# Patient Record
Sex: MALE | Race: WHITE | NOT HISPANIC OR LATINO | URBAN - METROPOLITAN AREA
[De-identification: names, ages, dates, MRNs, and addresses within clinical notes are randomized per-mention and may not be internally consistent; named-entity substitution may affect disease eponyms.]

---

## 2023-08-03 ENCOUNTER — APPOINTMENT (OUTPATIENT)
Dept: PRIMARY CARE | Facility: CLINIC | Age: 63
End: 2023-08-03
Payer: MEDICARE

## 2023-08-16 RX ORDER — BENZTROPINE MESYLATE 0.5 MG/1
TABLET ORAL
COMMUNITY
Start: 2023-07-20

## 2023-08-16 RX ORDER — OLANZAPINE 20 MG/1
TABLET ORAL
COMMUNITY
Start: 2023-07-20

## 2023-08-16 RX ORDER — GLYBURIDE 5 MG/1
TABLET ORAL
COMMUNITY
Start: 2023-07-20 | End: 2023-08-17 | Stop reason: SDUPTHER

## 2023-08-16 RX ORDER — ZIPRASIDONE HYDROCHLORIDE 20 MG/1
1 CAPSULE ORAL NIGHTLY
COMMUNITY
Start: 2016-10-10

## 2023-08-16 RX ORDER — CLONAZEPAM 0.5 MG/1
1 TABLET ORAL NIGHTLY
COMMUNITY
Start: 2016-08-15

## 2023-08-16 RX ORDER — SERTRALINE HYDROCHLORIDE 50 MG/1
TABLET, FILM COATED ORAL
COMMUNITY
Start: 2023-07-20

## 2023-08-17 ENCOUNTER — OFFICE VISIT (OUTPATIENT)
Dept: PRIMARY CARE | Facility: CLINIC | Age: 63
End: 2023-08-17
Payer: MEDICARE

## 2023-08-17 ENCOUNTER — LAB (OUTPATIENT)
Dept: LAB | Facility: LAB | Age: 63
End: 2023-08-17
Payer: MEDICARE

## 2023-08-17 VITALS
RESPIRATION RATE: 16 BRPM | HEART RATE: 70 BPM | WEIGHT: 170 LBS | BODY MASS INDEX: 26.68 KG/M2 | DIASTOLIC BLOOD PRESSURE: 62 MMHG | HEIGHT: 67 IN | OXYGEN SATURATION: 96 % | SYSTOLIC BLOOD PRESSURE: 126 MMHG

## 2023-08-17 DIAGNOSIS — F17.218 NICOTINE DEPENDENCE, CIGARETTES, WITH OTHER NICOTINE-INDUCED DISORDERS: ICD-10-CM

## 2023-08-17 DIAGNOSIS — F25.0 SCHIZOAFFECTIVE DISORDER, BIPOLAR TYPE (MULTI): ICD-10-CM

## 2023-08-17 DIAGNOSIS — E08.65 DIABETES MELLITUS DUE TO UNDERLYING CONDITION WITH HYPERGLYCEMIA, WITHOUT LONG-TERM CURRENT USE OF INSULIN (MULTI): ICD-10-CM

## 2023-08-17 DIAGNOSIS — F42.9 OBSESSIVE-COMPULSIVE DISORDER, UNSPECIFIED TYPE: ICD-10-CM

## 2023-08-17 DIAGNOSIS — Z00.00 ANNUAL PHYSICAL EXAM: ICD-10-CM

## 2023-08-17 DIAGNOSIS — F41.1 GENERALIZED ANXIETY DISORDER: ICD-10-CM

## 2023-08-17 DIAGNOSIS — Z72.0 TOBACCO ABUSE: ICD-10-CM

## 2023-08-17 DIAGNOSIS — K21.9 GASTROESOPHAGEAL REFLUX DISEASE, UNSPECIFIED WHETHER ESOPHAGITIS PRESENT: ICD-10-CM

## 2023-08-17 DIAGNOSIS — E78.2 MIXED HYPERLIPIDEMIA: ICD-10-CM

## 2023-08-17 DIAGNOSIS — E78.2 MIXED HYPERLIPIDEMIA: Primary | ICD-10-CM

## 2023-08-17 DIAGNOSIS — F41.9 ANXIETY: ICD-10-CM

## 2023-08-17 DIAGNOSIS — R53.83 OTHER FATIGUE: ICD-10-CM

## 2023-08-17 DIAGNOSIS — G47.30 SLEEP APNEA, UNSPECIFIED TYPE: ICD-10-CM

## 2023-08-17 DIAGNOSIS — F20.9 SCHIZOPHRENIA, UNSPECIFIED TYPE (MULTI): ICD-10-CM

## 2023-08-17 PROCEDURE — 83036 HEMOGLOBIN GLYCOSYLATED A1C: CPT

## 2023-08-17 PROCEDURE — 36415 COLL VENOUS BLD VENIPUNCTURE: CPT

## 2023-08-17 PROCEDURE — 3074F SYST BP LT 130 MM HG: CPT | Performed by: INTERNAL MEDICINE

## 2023-08-17 PROCEDURE — 3078F DIAST BP <80 MM HG: CPT | Performed by: INTERNAL MEDICINE

## 2023-08-17 PROCEDURE — 3052F HG A1C>EQUAL 8.0%<EQUAL 9.0%: CPT | Performed by: INTERNAL MEDICINE

## 2023-08-17 PROCEDURE — 99204 OFFICE O/P NEW MOD 45 MIN: CPT | Performed by: INTERNAL MEDICINE

## 2023-08-17 PROCEDURE — 99406 BEHAV CHNG SMOKING 3-10 MIN: CPT | Performed by: INTERNAL MEDICINE

## 2023-08-17 RX ORDER — GLYBURIDE 5 MG/1
5 TABLET ORAL
Qty: 90 TABLET | Refills: 3 | Status: SHIPPED
Start: 2023-08-17 | End: 2023-08-29 | Stop reason: ALTCHOICE

## 2023-08-17 RX ORDER — ATORVASTATIN CALCIUM 40 MG/1
40 TABLET, FILM COATED ORAL DAILY
Qty: 30 TABLET | Refills: 11 | Status: SHIPPED | OUTPATIENT
Start: 2023-08-17 | End: 2023-08-17 | Stop reason: ALTCHOICE

## 2023-08-17 RX ORDER — ATORVASTATIN CALCIUM 40 MG/1
40 TABLET, FILM COATED ORAL DAILY
Qty: 30 TABLET | Refills: 11 | Status: SHIPPED
Start: 2023-08-17 | End: 2023-11-30 | Stop reason: SDUPTHER

## 2023-08-18 LAB
ESTIMATED AVERAGE GLUCOSE FOR HBA1C: 206 MG/DL
HEMOGLOBIN A1C/HEMOGLOBIN TOTAL IN BLOOD: 8.8 %

## 2023-08-29 PROBLEM — E78.2 MIXED HYPERLIPIDEMIA: Status: ACTIVE | Noted: 2023-08-29

## 2023-08-29 PROBLEM — F25.0 SCHIZOAFFECTIVE DISORDER, BIPOLAR TYPE (MULTI): Status: ACTIVE | Noted: 2019-02-22

## 2023-08-29 PROBLEM — F20.9 SCHIZOPHRENIA (MULTI): Status: ACTIVE | Noted: 2022-04-03

## 2023-08-29 PROBLEM — F42.9 OCD (OBSESSIVE COMPULSIVE DISORDER): Status: ACTIVE | Noted: 2019-02-22

## 2023-08-29 PROBLEM — Z72.0 TOBACCO ABUSE: Status: ACTIVE | Noted: 2023-08-29

## 2023-08-29 PROBLEM — G47.30 SLEEP APNEA: Status: ACTIVE | Noted: 2023-08-29

## 2023-08-29 PROBLEM — K21.9 GASTROESOPHAGEAL REFLUX DISEASE: Status: ACTIVE | Noted: 2023-08-29

## 2023-08-29 PROBLEM — F17.218 NICOTINE DEPENDENCE, CIGARETTES, WITH OTHER NICOTINE-INDUCED DISORDERS: Status: ACTIVE | Noted: 2023-08-29

## 2023-08-29 PROBLEM — E08.65 DIABETES MELLITUS DUE TO UNDERLYING CONDITION WITH HYPERGLYCEMIA, WITHOUT LONG-TERM CURRENT USE OF INSULIN (MULTI): Status: ACTIVE | Noted: 2023-08-29

## 2023-08-29 PROBLEM — F41.9 ANXIETY: Status: ACTIVE | Noted: 2023-08-29

## 2023-08-29 PROBLEM — F41.1 GENERALIZED ANXIETY DISORDER: Status: ACTIVE | Noted: 2021-01-11

## 2023-08-29 RX ORDER — GLYBURIDE 5 MG/1
5 TABLET ORAL
Qty: 60 TABLET | Refills: 11 | Status: SHIPPED | OUTPATIENT
Start: 2023-08-29 | End: 2023-08-30 | Stop reason: SDUPTHER

## 2023-08-30 DIAGNOSIS — E08.65 DIABETES MELLITUS DUE TO UNDERLYING CONDITION WITH HYPERGLYCEMIA, WITHOUT LONG-TERM CURRENT USE OF INSULIN (MULTI): ICD-10-CM

## 2023-08-30 RX ORDER — GLYBURIDE 5 MG/1
5 TABLET ORAL
Qty: 60 TABLET | Refills: 11 | Status: SHIPPED | OUTPATIENT
Start: 2023-08-30 | End: 2023-11-30 | Stop reason: SDUPTHER

## 2023-08-30 NOTE — PROGRESS NOTES
"Evon Hunter is a 63 y.o. male who presents for New Patient Visit ([facility]).  Patient presents to [facility].  Patient has multiple psych issues.  He follows with recovery resources.  No acute issues or complaints.  He request medication refills.  Nicotine cessation discussed for 7 minutes.  Patient is agreeable to trying to quit.  Lung cancer screening discussed with patient.        Objective     /62 (BP Location: Left arm, Patient Position: Sitting, BP Cuff Size: Adult)   Pulse 70   Resp 16   Ht 1.702 m (5' 7\")   Wt 77.1 kg (170 lb)   SpO2 96%   BMI 26.63 kg/m²      Physical Exam  Constitutional:       Appearance: Normal appearance.   HENT:      Head: Normocephalic and atraumatic.      Nose: Nose normal.      Mouth/Throat:      Mouth: Mucous membranes are moist.      Pharynx: Oropharynx is clear.   Eyes:      Extraocular Movements: Extraocular movements intact.      Pupils: Pupils are equal, round, and reactive to light.   Cardiovascular:      Rate and Rhythm: Normal rate and regular rhythm.   Pulmonary:      Effort: No respiratory distress.      Breath sounds: Normal breath sounds. No wheezing, rhonchi or rales.   Abdominal:      General: Bowel sounds are normal. There is no distension.      Palpations: Abdomen is soft.      Tenderness: There is no abdominal tenderness. There is no guarding.   Musculoskeletal:      Right lower leg: No edema.      Left lower leg: No edema.   Skin:     General: Skin is warm and dry.   Neurological:      Mental Status: He is alert and oriented to person, place, and time. Mental status is at baseline.   Psychiatric:         Mood and Affect: Mood normal.         Behavior: Behavior normal.         Assessment/Plan   Problem List Items Addressed This Visit       Mixed hyperlipidemia - Primary    Relevant Medications    atorvastatin (Lipitor) 40 mg tablet    Other Relevant Orders    Hemoglobin A1C (Completed)    Diabetes mellitus due to underlying " condition with hyperglycemia, without long-term current use of insulin (CMS/HCC)    Relevant Medications    glyBURIDE (Diabeta) 5 mg tablet    Other Relevant Orders    Hemoglobin A1C (Completed)    Tobacco abuse    Relevant Orders    CT lung screening low dose    Nicotine dependence, cigarettes, with other nicotine-induced disorders    Relevant Orders    CT lung screening low dose    Anxiety    Gastroesophageal reflux disease    Schizoaffective disorder, bipolar type (CMS/HCC)    OCD (obsessive compulsive disorder)    Generalized anxiety disorder    Schizophrenia (CMS/[facility])    Sleep apnea     Continue medications as previously prescribed, refills provided  Tobacco cessation discussed for 7 minutes, will try nicotine patch  Check hemoglobin A1c  Return in 3 months for a physical       Osmar Reed DO

## 2023-09-01 ENCOUNTER — TELEPHONE (OUTPATIENT)
Dept: PRIMARY CARE | Facility: CLINIC | Age: 63
End: 2023-09-01
Payer: MEDICARE

## 2023-10-10 ENCOUNTER — HOSPITAL ENCOUNTER (OUTPATIENT)
Dept: RADIOLOGY | Facility: CLINIC | Age: 63
Discharge: HOME | End: 2023-10-10
Payer: MEDICARE

## 2023-10-10 DIAGNOSIS — F17.218 NICOTINE DEPENDENCE, CIGARETTES, WITH OTHER NICOTINE-INDUCED DISORDERS: ICD-10-CM

## 2023-10-10 DIAGNOSIS — Z72.0 TOBACCO USE: ICD-10-CM

## 2023-10-10 PROCEDURE — 71271 CT THORAX LUNG CANCER SCR C-: CPT

## 2023-10-10 PROCEDURE — 71271 CT THORAX LUNG CANCER SCR C-: CPT | Performed by: RADIOLOGY

## 2023-10-12 DIAGNOSIS — I15.9 SECONDARY HYPERTENSION: ICD-10-CM

## 2023-10-12 DIAGNOSIS — F17.218 NICOTINE DEPENDENCE, CIGARETTES, WITH OTHER NICOTINE-INDUCED DISORDERS: ICD-10-CM

## 2023-10-12 DIAGNOSIS — Z72.0 TOBACCO ABUSE: ICD-10-CM

## 2023-10-14 RX ORDER — ASPIRIN 81 MG/1
81 TABLET ORAL DAILY
Qty: 30 TABLET | Refills: 11 | Status: SHIPPED | OUTPATIENT
Start: 2023-10-14 | End: 2024-10-13

## 2023-11-13 ENCOUNTER — TELEPHONE (OUTPATIENT)
Dept: PRIMARY CARE | Facility: CLINIC | Age: 63
End: 2023-11-13
Payer: MEDICARE

## 2023-11-21 ENCOUNTER — LAB (OUTPATIENT)
Dept: LAB | Facility: LAB | Age: 63
End: 2023-11-21
Payer: MEDICARE

## 2023-11-21 ENCOUNTER — APPOINTMENT (OUTPATIENT)
Dept: PRIMARY CARE | Facility: CLINIC | Age: 63
End: 2023-11-21
Payer: MEDICARE

## 2023-11-21 DIAGNOSIS — R53.83 OTHER FATIGUE: ICD-10-CM

## 2023-11-21 DIAGNOSIS — Z00.00 ANNUAL PHYSICAL EXAM: ICD-10-CM

## 2023-11-21 DIAGNOSIS — E78.2 MIXED HYPERLIPIDEMIA: ICD-10-CM

## 2023-11-21 DIAGNOSIS — E08.65 DIABETES MELLITUS DUE TO UNDERLYING CONDITION WITH HYPERGLYCEMIA, WITHOUT LONG-TERM CURRENT USE OF INSULIN (MULTI): ICD-10-CM

## 2023-11-21 LAB
ALBUMIN SERPL BCP-MCNC: 4.4 G/DL (ref 3.4–5)
ALP SERPL-CCNC: 86 U/L (ref 33–136)
ALT SERPL W P-5'-P-CCNC: 21 U/L (ref 10–52)
ANION GAP SERPL CALC-SCNC: 15 MMOL/L (ref 10–20)
AST SERPL W P-5'-P-CCNC: 12 U/L (ref 9–39)
BILIRUB SERPL-MCNC: 0.5 MG/DL (ref 0–1.2)
BUN SERPL-MCNC: 14 MG/DL (ref 6–23)
CALCIUM SERPL-MCNC: 9.2 MG/DL (ref 8.6–10.3)
CHLORIDE SERPL-SCNC: 102 MMOL/L (ref 98–107)
CHOLEST SERPL-MCNC: 260 MG/DL (ref 0–199)
CHOLESTEROL/HDL RATIO: 7
CO2 SERPL-SCNC: 28 MMOL/L (ref 21–32)
CREAT SERPL-MCNC: 1.08 MG/DL (ref 0.5–1.3)
ERYTHROCYTE [DISTWIDTH] IN BLOOD BY AUTOMATED COUNT: 12.3 % (ref 11.5–14.5)
GFR SERPL CREATININE-BSD FRML MDRD: 78 ML/MIN/1.73M*2
GLUCOSE SERPL-MCNC: 232 MG/DL (ref 74–99)
HCT VFR BLD AUTO: 55.2 % (ref 41–52)
HDLC SERPL-MCNC: 37.3 MG/DL
HGB BLD-MCNC: 17.9 G/DL (ref 13.5–17.5)
LDLC SERPL CALC-MCNC: 161 MG/DL
MCH RBC QN AUTO: 31.5 PG (ref 26–34)
MCHC RBC AUTO-ENTMCNC: 32.4 G/DL (ref 32–36)
MCV RBC AUTO: 97 FL (ref 80–100)
NON HDL CHOLESTEROL: 223 MG/DL (ref 0–149)
NRBC BLD-RTO: 0 /100 WBCS (ref 0–0)
PLATELET # BLD AUTO: 275 X10*3/UL (ref 150–450)
POTASSIUM SERPL-SCNC: 4.8 MMOL/L (ref 3.5–5.3)
PROT SERPL-MCNC: 7.1 G/DL (ref 6.4–8.2)
PSA SERPL-MCNC: 0.74 NG/ML
RBC # BLD AUTO: 5.69 X10*6/UL (ref 4.5–5.9)
SODIUM SERPL-SCNC: 140 MMOL/L (ref 136–145)
TRIGL SERPL-MCNC: 310 MG/DL (ref 0–149)
TSH SERPL-ACNC: 3.71 MIU/L (ref 0.44–3.98)
VLDL: 62 MG/DL (ref 0–40)
WBC # BLD AUTO: 9.3 X10*3/UL (ref 4.4–11.3)

## 2023-11-21 PROCEDURE — 80053 COMPREHEN METABOLIC PANEL: CPT

## 2023-11-21 PROCEDURE — 84153 ASSAY OF PSA TOTAL: CPT

## 2023-11-21 PROCEDURE — 84443 ASSAY THYROID STIM HORMONE: CPT

## 2023-11-21 PROCEDURE — 80061 LIPID PANEL: CPT

## 2023-11-21 PROCEDURE — 83036 HEMOGLOBIN GLYCOSYLATED A1C: CPT

## 2023-11-21 PROCEDURE — 36415 COLL VENOUS BLD VENIPUNCTURE: CPT

## 2023-11-21 PROCEDURE — 85027 COMPLETE CBC AUTOMATED: CPT

## 2023-11-22 LAB
EST. AVERAGE GLUCOSE BLD GHB EST-MCNC: 235 MG/DL
HBA1C MFR BLD: 9.8 %

## 2023-11-30 ENCOUNTER — OFFICE VISIT (OUTPATIENT)
Dept: PRIMARY CARE | Facility: CLINIC | Age: 63
End: 2023-11-30
Payer: MEDICARE

## 2023-11-30 VITALS
DIASTOLIC BLOOD PRESSURE: 74 MMHG | WEIGHT: 177 LBS | SYSTOLIC BLOOD PRESSURE: 126 MMHG | OXYGEN SATURATION: 96 % | RESPIRATION RATE: 16 BRPM | BODY MASS INDEX: 27.78 KG/M2 | HEIGHT: 67 IN | HEART RATE: 89 BPM

## 2023-11-30 DIAGNOSIS — F41.1 GENERALIZED ANXIETY DISORDER: ICD-10-CM

## 2023-11-30 DIAGNOSIS — E78.2 MIXED HYPERLIPIDEMIA: ICD-10-CM

## 2023-11-30 DIAGNOSIS — Z23 FLU VACCINE NEED: ICD-10-CM

## 2023-11-30 DIAGNOSIS — F41.9 ANXIETY: Primary | ICD-10-CM

## 2023-11-30 DIAGNOSIS — E08.65 DIABETES MELLITUS DUE TO UNDERLYING CONDITION WITH HYPERGLYCEMIA, WITHOUT LONG-TERM CURRENT USE OF INSULIN (MULTI): ICD-10-CM

## 2023-11-30 PROBLEM — R63.4 WEIGHT LOSS: Status: ACTIVE | Noted: 2023-11-30

## 2023-11-30 PROBLEM — S06.5XAA SDH (SUBDURAL HEMATOMA) (MULTI): Status: ACTIVE | Noted: 2023-11-30

## 2023-11-30 PROBLEM — S06.4XAA EPIDURAL HEMATOMA (MULTI): Status: ACTIVE | Noted: 2023-11-30

## 2023-11-30 PROBLEM — K59.00 CONSTIPATION: Status: ACTIVE | Noted: 2023-11-30

## 2023-11-30 PROBLEM — R43.0 ANOSMIA: Status: ACTIVE | Noted: 2023-11-30

## 2023-11-30 PROBLEM — H61.23 IMPACTED CERUMEN OF BOTH EARS: Status: ACTIVE | Noted: 2023-11-30

## 2023-11-30 PROBLEM — H90.12 CONDUCTIVE HEARING LOSS IN LEFT EAR: Status: ACTIVE | Noted: 2023-11-30

## 2023-11-30 PROCEDURE — 3050F LDL-C >= 130 MG/DL: CPT | Performed by: INTERNAL MEDICINE

## 2023-11-30 PROCEDURE — 99396 PREV VISIT EST AGE 40-64: CPT | Performed by: INTERNAL MEDICINE

## 2023-11-30 PROCEDURE — 3078F DIAST BP <80 MM HG: CPT | Performed by: INTERNAL MEDICINE

## 2023-11-30 PROCEDURE — 3046F HEMOGLOBIN A1C LEVEL >9.0%: CPT | Performed by: INTERNAL MEDICINE

## 2023-11-30 PROCEDURE — 90686 IIV4 VACC NO PRSV 0.5 ML IM: CPT | Performed by: INTERNAL MEDICINE

## 2023-11-30 PROCEDURE — 99214 OFFICE O/P EST MOD 30 MIN: CPT | Performed by: INTERNAL MEDICINE

## 2023-11-30 PROCEDURE — G0008 ADMIN INFLUENZA VIRUS VAC: HCPCS | Performed by: INTERNAL MEDICINE

## 2023-11-30 PROCEDURE — 3074F SYST BP LT 130 MM HG: CPT | Performed by: INTERNAL MEDICINE

## 2023-11-30 RX ORDER — GLYBURIDE 5 MG/1
10 TABLET ORAL
Qty: 360 TABLET | Refills: 3 | Status: SHIPPED | OUTPATIENT
Start: 2023-11-30 | End: 2024-02-29 | Stop reason: SDUPTHER

## 2023-11-30 RX ORDER — ATORVASTATIN CALCIUM 80 MG/1
80 TABLET, FILM COATED ORAL DAILY
Qty: 90 TABLET | Refills: 3 | Status: SHIPPED | OUTPATIENT
Start: 2023-11-30 | End: 2024-11-29

## 2023-11-30 ASSESSMENT — PAIN SCALES - GENERAL: PAINLEVEL: 0-NO PAIN

## 2023-11-30 ASSESSMENT — PATIENT HEALTH QUESTIONNAIRE - PHQ9
SUM OF ALL RESPONSES TO PHQ9 QUESTIONS 1 & 2: 0
1. LITTLE INTEREST OR PLEASURE IN DOING THINGS: NOT AT ALL
2. FEELING DOWN, DEPRESSED OR HOPELESS: NOT AT ALL

## 2023-12-22 NOTE — PROGRESS NOTES
"Evon Hunter is a 63 y.o. male who presents for Annual Exam.  Patient presents for an annual physical.  Wife present.  EKG obtained and shows normal sinus rhythm.  No chest pain or shortness of breath.  Labs reviewed with patient.  Cholesterol levels discussed.  Patient agreeable to increasing Lipitor.  Hemoglobin A1c is 9.8%, up from 8.8%.  Discussed medications.  Discussed importance of maintaining a healthy diet, regular exercise, and weight loss.  Flu shot administered in the office today.  Colon cancer screening discussed.  Patient does not want a colonoscopy.  Patient does complain of hemorrhoids.  Discussed goal of 1 bowel movement daily with no straining and no blood.  Patient offered rectal cream and/or suppository but deferred.        Objective     /74 (BP Location: Left arm, Patient Position: Sitting, BP Cuff Size: Adult)   Pulse 89   Resp 16   Ht 1.702 m (5' 7\")   Wt 80.3 kg (177 lb)   SpO2 96%   BMI 27.72 kg/m²      Physical Exam  Constitutional:       Appearance: Normal appearance.   HENT:      Head: Normocephalic and atraumatic.      Nose: Nose normal.      Mouth/Throat:      Mouth: Mucous membranes are moist.      Pharynx: Oropharynx is clear.   Eyes:      Extraocular Movements: Extraocular movements intact.      Pupils: Pupils are equal, round, and reactive to light.   Cardiovascular:      Rate and Rhythm: Normal rate and regular rhythm.   Pulmonary:      Effort: No respiratory distress.      Breath sounds: Normal breath sounds. No wheezing, rhonchi or rales.   Abdominal:      General: Bowel sounds are normal. There is no distension.      Palpations: Abdomen is soft.      Tenderness: There is no abdominal tenderness. There is no guarding.   Musculoskeletal:      Right lower leg: No edema.      Left lower leg: No edema.   Skin:     General: Skin is warm and dry.   Neurological:      Mental Status: He is alert and oriented to person, place, and time. Mental status is at " baseline.   Psychiatric:         Mood and Affect: Mood normal.         Behavior: Behavior normal.         Assessment/Plan   Problem List Items Addressed This Visit       Mixed hyperlipidemia     Increase Lipitor to 80 mg         Relevant Medications    atorvastatin (Lipitor) 80 mg tablet    Diabetes mellitus due to underlying condition with hyperglycemia, without long-term current use of insulin (CMS/[facility])     Increase glyburide to 10 mg twice daily         Relevant Medications    glyBURIDE (Diabeta) 5 mg tablet    Other Relevant Orders    Basic Metabolic Panel    Hemoglobin A1C    Anxiety - Primary     Continue current medications         Generalized anxiety disorder     Continue current medications          Other Visit Diagnoses       Flu vaccine need        Relevant Orders    Flu vaccine (IIV4) age 6 months and greater, preservative free (Completed)          Increase Lipitor to 80  Increase glyburide to 10 twice daily  Follow-up in 3 months to monitor hemoglobin A1c       Osmar Reed, DO

## 2024-02-29 ENCOUNTER — OFFICE VISIT (OUTPATIENT)
Dept: PRIMARY CARE | Facility: CLINIC | Age: 64
End: 2024-02-29
Payer: MEDICARE

## 2024-02-29 VITALS
OXYGEN SATURATION: 96 % | SYSTOLIC BLOOD PRESSURE: 126 MMHG | HEART RATE: 68 BPM | RESPIRATION RATE: 16 BRPM | DIASTOLIC BLOOD PRESSURE: 82 MMHG | HEIGHT: 67 IN | BODY MASS INDEX: 27 KG/M2 | WEIGHT: 172 LBS

## 2024-02-29 DIAGNOSIS — I73.9 CLAUDICATION OF BOTH LOWER EXTREMITIES (CMS-HCC): Primary | ICD-10-CM

## 2024-02-29 DIAGNOSIS — E08.65 DIABETES MELLITUS DUE TO UNDERLYING CONDITION WITH HYPERGLYCEMIA, WITHOUT LONG-TERM CURRENT USE OF INSULIN (MULTI): ICD-10-CM

## 2024-02-29 PROCEDURE — 99214 OFFICE O/P EST MOD 30 MIN: CPT | Performed by: INTERNAL MEDICINE

## 2024-02-29 PROCEDURE — 3074F SYST BP LT 130 MM HG: CPT | Performed by: INTERNAL MEDICINE

## 2024-02-29 PROCEDURE — 3079F DIAST BP 80-89 MM HG: CPT | Performed by: INTERNAL MEDICINE

## 2024-02-29 RX ORDER — GLYBURIDE 5 MG/1
10 TABLET ORAL
Qty: 360 TABLET | Refills: 3 | Status: SHIPPED | OUTPATIENT
Start: 2024-02-29 | End: 2025-02-28

## 2024-02-29 ASSESSMENT — PAIN SCALES - GENERAL: PAINLEVEL: 0-NO PAIN

## 2024-03-15 ENCOUNTER — HOSPITAL ENCOUNTER (OUTPATIENT)
Dept: VASCULAR MEDICINE | Facility: HOSPITAL | Age: 64
Discharge: HOME | End: 2024-03-15
Payer: MEDICARE

## 2024-03-15 ENCOUNTER — APPOINTMENT (OUTPATIENT)
Dept: VASCULAR MEDICINE | Facility: HOSPITAL | Age: 64
End: 2024-03-15
Payer: MEDICARE

## 2024-03-15 DIAGNOSIS — I73.9 CLAUDICATION OF BOTH LOWER EXTREMITIES (CMS-HCC): ICD-10-CM

## 2024-03-15 PROCEDURE — 93922 UPR/L XTREMITY ART 2 LEVELS: CPT | Performed by: INTERNAL MEDICINE

## 2024-03-15 PROCEDURE — 93922 UPR/L XTREMITY ART 2 LEVELS: CPT

## 2024-03-17 NOTE — PROGRESS NOTES
"Evon Hunter is a 63 y.o. male who presents for Follow-up.  Patient presents for follow-up diabetes.  His blood glucose is 230 today.  He states he is compliant with his medications.    Patient describes cramping Pain with walking.  Patient still smokes.        Objective     /82 (BP Location: Right arm, Patient Position: Sitting, BP Cuff Size: Adult)   Pulse 68   Resp 16   Ht 1.702 m (5' 7\")   Wt 78 kg (172 lb)   SpO2 96%   BMI 26.94 kg/m²      Physical Exam  Constitutional:       Appearance: Normal appearance.   HENT:      Head: Normocephalic and atraumatic.      Nose: Nose normal.      Mouth/Throat:      Mouth: Mucous membranes are moist.      Pharynx: Oropharynx is clear.   Eyes:      Extraocular Movements: Extraocular movements intact.      Pupils: Pupils are equal, round, and reactive to light.   Cardiovascular:      Rate and Rhythm: Normal rate and regular rhythm.   Pulmonary:      Effort: No respiratory distress.      Breath sounds: Normal breath sounds. No wheezing, rhonchi or rales.   Abdominal:      General: Bowel sounds are normal. There is no distension.      Palpations: Abdomen is soft.      Tenderness: There is no abdominal tenderness. There is no guarding.   Musculoskeletal:      Right lower leg: No edema.      Left lower leg: No edema.   Skin:     General: Skin is warm and dry.   Neurological:      Mental Status: He is alert and oriented to person, place, and time. Mental status is at baseline.   Psychiatric:         Mood and Affect: Mood normal.         Behavior: Behavior normal.         Assessment/Plan   Problem List Items Addressed This Visit       Diabetes mellitus due to underlying condition with hyperglycemia, without long-term current use of insulin (CMS/[facility])    Relevant Medications    glyBURIDE (Diabeta) 5 mg tablet     Other Visit Diagnoses       Claudication of both lower extremities (CMS/[facility])    -  Primary          Patient with risk factors for PAD, will " obtain TAMRA and PVR given his claudication  Return in 3 months for repeat hemoglobin A1c, will consider metformin or Jardiance at that time       Osmar Reed, DO

## 2024-06-06 ENCOUNTER — OFFICE VISIT (OUTPATIENT)
Dept: VASCULAR SURGERY | Facility: CLINIC | Age: 64
End: 2024-06-06
Payer: MEDICARE

## 2024-06-06 VITALS
SYSTOLIC BLOOD PRESSURE: 130 MMHG | BODY MASS INDEX: 26.37 KG/M2 | WEIGHT: 168 LBS | DIASTOLIC BLOOD PRESSURE: 78 MMHG | HEIGHT: 67 IN | OXYGEN SATURATION: 98 % | HEART RATE: 87 BPM

## 2024-06-06 DIAGNOSIS — I73.9 CLAUDICATION OF BOTH LOWER EXTREMITIES (CMS-HCC): ICD-10-CM

## 2024-06-06 PROCEDURE — 3078F DIAST BP <80 MM HG: CPT | Performed by: SURGERY

## 2024-06-06 PROCEDURE — 3075F SYST BP GE 130 - 139MM HG: CPT | Performed by: SURGERY

## 2024-06-06 PROCEDURE — 99204 OFFICE O/P NEW MOD 45 MIN: CPT | Performed by: SURGERY

## 2024-06-06 NOTE — PROGRESS NOTES
"Judah Hunter is a 64 y.o. male     Subjective   This patient presents today as a new consult for evaluation of bilateral lower extremity claudication.  He continues to smoke.  He is 64 years old.  He has a history of significant diabetes and high cholesterol.  He has no known drug allergies.  He is 5 foot 7 and weighs 168 pounds.  He does not drive.  He does walk a lot where he needs to go.  He states that he can walk adequate distances but is difficult because of the calf pain that starts bilaterally.  He is on aspirin and high-dose Lipitor.  His other medications can be found in the chart.         Objective     Vitals:    06/06/24 0900   BP: 130/78   Pulse: 87   SpO2: 98%      Physical Exam  This patient is alert and mostly oriented.  He is in no acute distress.  Head is normocephalic.  Pupils are equal and reactive to light accommodation.  Neck is soft and supple without palpable lymph nodes.  Heart is regular rate.  Lungs have some decreased breath sounds posteriorly.  Abdomen is soft with positive bowel sounds there is no pain on palpation.  Upper and lower extremities seem to have adequate range of motion.  He does have palpable brachial radial and femoral pulses.  He does have relatively weak popliteal pulses.  Skin turgor is adequate.  Psychologically he appears to be acting appropriately.  His thought process seems relatively odd.  Blood pressure 130/78, pulse 87, height 1.702 m (5' 7\"), weight 76.2 kg (168 lb), SpO2 98%.            Patient Active Problem List    Diagnosis Date Noted    Anosmia 11/30/2023    Conductive hearing loss in left ear 11/30/2023    Constipation 11/30/2023    Epidural hematoma (Multi) 11/30/2023    SDH (subdural hematoma) (Multi) 11/30/2023    Impacted cerumen of both ears 11/30/2023    Weight loss 11/30/2023    Mixed hyperlipidemia 08/29/2023    Diabetes mellitus due to underlying condition with hyperglycemia, without long-term current use of insulin (Multi) 08/29/2023    " Tobacco abuse 08/29/2023    Nicotine dependence, cigarettes, with other nicotine-induced disorders 08/29/2023    Anxiety 08/29/2023    Gastroesophageal reflux disease 08/29/2023    Sleep apnea 08/29/2023    Schizophrenia (Multi) 04/03/2022    Generalized anxiety disorder 01/11/2021    Schizoaffective disorder, bipolar type (Multi) 02/22/2019    OCD (obsessive compulsive disorder) 02/22/2019    Irritation of penis 09/23/2010    Dysthymic disorder 07/02/2010    Motor tic disorder 07/02/2010          Current Outpatient Medications:     aspirin 81 mg EC tablet, Take 1 tablet (81 mg) by mouth once daily., Disp: 30 tablet, Rfl: 11    atorvastatin (Lipitor) 80 mg tablet, Take 1 tablet (80 mg) by mouth once daily., Disp: 90 tablet, Rfl: 3    benztropine (Cogentin) 0.5 mg tablet, , Disp: , Rfl:     clonazePAM (KlonoPIN) 0.5 mg tablet, Take 1 tablet (0.5 mg) by mouth once daily at bedtime., Disp: , Rfl:     glyBURIDE (Diabeta) 5 mg tablet, Take 2 tablets (10 mg) by mouth 2 times a day with meals., Disp: 360 tablet, Rfl: 3    OLANZapine (ZyPREXA) 20 mg tablet, , Disp: , Rfl:     sertraline (Zoloft) 50 mg tablet, , Disp: , Rfl:     ziprasidone (Geodon) 20 mg capsule, Take 1 capsule (20 mg) by mouth once daily at bedtime., Disp: , Rfl:      Lab Results   Component Value Date    WBC 9.3 11/21/2023    HGB 17.9 (H) 11/21/2023    HCT 55.2 (H) 11/21/2023     11/21/2023    CHOL 260 (H) 11/21/2023    TRIG 310 (H) 11/21/2023    HDL 37.3 11/21/2023    ALT 21 11/21/2023    AST 12 11/21/2023     11/21/2023    K 4.8 11/21/2023     11/21/2023    CREATININE 1.08 11/21/2023    BUN 14 11/21/2023    CO2 28 11/21/2023    TSH 3.71 11/21/2023    PSA 0.50 04/23/2021    HGBA1C 9.8 (H) 11/21/2023       Vascular US ankle brachial index (TAMRA) without exercise    Result Date: 3/16/2024           [facility] [address] [place], [place] [address] Tel [phone] and Fax [phone]  Vascular Lab Report [facility] US ANKLE BRACHIAL  INDEX (TAMRA) WITHOUT EXERCISE  Patient Name:      LIBORIO COTA   Reading Physician:  [id number] Nicole Regalado MD Study Date:        3/15/2024            Ordering Physician: [id number] PEDRO SHEFFIELD MRN/PID:           [id number]             Technologist:       Padmaja Lee [facility] Accession#:        [id number]         Technologist 2: Date of Birth/Age: 1960 / 63 years Encounter#:         [id number] Gender:            M Admission Status:  Outpatient           Location Performed: [facility]  Diagnosis/ICD: Peripheral vascular disease, unspecified-I73.9 Indication:    Peripheral vascular disease CPT Codes:     01937 Peripheral artery TAMRA Only  CONCLUSIONS: Right Lower PVR: Evidence of moderate arterial occlusive disease in the right lower extremity at rest. Decreased digital perfusion noted. Monophasic flow is noted in the right common femoral artery, right posterior tibial artery and right dorsalis pedis artery. Evidence of moderate disease by pressures but tracings appear severely dampened. Left Lower PVR: Evidence of moderate arterial occlusive disease in the left lower extremity at rest. Decreased digital perfusion noted. Monophasic flow is noted in the left posterior tibial artery and left dorsalis pedis artery. Biphasic flow is noted in the left common femoral artery. Evidence of moderate disease by pressures but tracings appear severely dampened.  Imaging & Doppler Findings:  RIGHT Lower PVR                Pressures Ratios Right Posterior Tibial (Ankle) 102 mmHg  0.67 Right Dorsalis Pedis (Ankle)   92 mmHg   0.61 Right Digit (Great Toe)        71 mmHg   0.47   LEFT Lower PVR                Pressures Ratios Left Posterior Tibial (Ankle) 101 mmHg  0.66 Left Dorsalis Pedis (Ankle)   96 mmHg   0.63 Left Digit (Great Toe)        75 mmHg   0.49                     Right     Left  Brachial Pressure 152 mmHg 152 mmHg   [id number] Nicole Regalado MD Electronically signed by [id number] Nicole Regalado MD on 3/16/2024 at 2:52:59 PM  ** Final **                 Assessment/Plan   Active Problems:  There are no active Hospital Problems.      This patient presents today as a new consult for evaluation of peripheral arterial disease and lower extremity pain with ambulation.  He is 64 years old.  He has a longstanding history of diabetes.  He continues to smoke a pack of cigarettes per day.  He does get pain in his legs equally.  He states that once his Pain gets bad enough he needs to stop and rest for a couple minutes and then he can ambulate again.  He does not own a vehicle and he does walk to all the places he needs to get to.  I am encouraging him to continue to walk is much as he can on a daily basis.  I had a very long talk with him concerning the need to quit smoking.  He does have palpable femoral pulses.  His popliteal pulses are very weak on palpation.  I do suspect he has SFA and tibial occlusive disease.  At this time, I would like for him to follow-up with me in 3 months with ABIs with exercise.  Thank you very much for allowing me to take part in the care of your patient.  Sincerely years, Dr. Hua Adams, DO

## 2024-08-06 ENCOUNTER — APPOINTMENT (OUTPATIENT)
Dept: PRIMARY CARE | Facility: CLINIC | Age: 64
End: 2024-08-06
Payer: MEDICARE

## 2024-08-06 VITALS
OXYGEN SATURATION: 95 % | DIASTOLIC BLOOD PRESSURE: 94 MMHG | RESPIRATION RATE: 18 BRPM | SYSTOLIC BLOOD PRESSURE: 140 MMHG | BODY MASS INDEX: 25.74 KG/M2 | WEIGHT: 164 LBS | HEART RATE: 93 BPM | HEIGHT: 67 IN

## 2024-08-06 DIAGNOSIS — E78.2 MIXED HYPERLIPIDEMIA: ICD-10-CM

## 2024-08-06 DIAGNOSIS — E08.65 DIABETES MELLITUS DUE TO UNDERLYING CONDITION WITH HYPERGLYCEMIA, WITHOUT LONG-TERM CURRENT USE OF INSULIN (MULTI): ICD-10-CM

## 2024-08-06 DIAGNOSIS — Z00.00 ENCOUNTER FOR ANNUAL WELLNESS VISIT (AWV) IN MEDICARE PATIENT: Primary | ICD-10-CM

## 2024-08-06 DIAGNOSIS — I73.9 PAD (PERIPHERAL ARTERY DISEASE) (CMS-HCC): ICD-10-CM

## 2024-08-06 DIAGNOSIS — F25.0 SCHIZOAFFECTIVE DISORDER, BIPOLAR TYPE (MULTI): ICD-10-CM

## 2024-08-06 DIAGNOSIS — Z72.0 TOBACCO ABUSE: ICD-10-CM

## 2024-08-06 DIAGNOSIS — R79.9 ABNORMAL FINDING OF BLOOD CHEMISTRY, UNSPECIFIED: ICD-10-CM

## 2024-08-06 DIAGNOSIS — E55.9 VITAMIN D DEFICIENCY, UNSPECIFIED: ICD-10-CM

## 2024-08-06 DIAGNOSIS — I15.9 SECONDARY HYPERTENSION: ICD-10-CM

## 2024-08-06 DIAGNOSIS — F17.218 NICOTINE DEPENDENCE, CIGARETTES, WITH OTHER NICOTINE-INDUCED DISORDERS: ICD-10-CM

## 2024-08-06 DIAGNOSIS — R32 URINARY INCONTINENCE, UNSPECIFIED TYPE: ICD-10-CM

## 2024-08-06 LAB
POC APPEARANCE, URINE: CLEAR
POC BILIRUBIN, URINE: ABNORMAL
POC BLOOD, URINE: ABNORMAL
POC COLOR, URINE: YELLOW
POC FINGERSTICK BLOOD GLUCOSE: 249 MG/DL (ref 70–100)
POC GLUCOSE, URINE: ABNORMAL MG/DL
POC KETONES, URINE: ABNORMAL MG/DL
POC LEUKOCYTES, URINE: NEGATIVE
POC NITRITE,URINE: NEGATIVE
POC PH, URINE: 6 PH
POC PROTEIN, URINE: NEGATIVE MG/DL
POC SPECIFIC GRAVITY, URINE: 1.01
POC UROBILINOGEN, URINE: 0.2 EU/DL

## 2024-08-06 PROCEDURE — 3008F BODY MASS INDEX DOCD: CPT | Performed by: INTERNAL MEDICINE

## 2024-08-06 PROCEDURE — 99214 OFFICE O/P EST MOD 30 MIN: CPT | Performed by: INTERNAL MEDICINE

## 2024-08-06 PROCEDURE — 4004F PT TOBACCO SCREEN RCVD TLK: CPT | Performed by: INTERNAL MEDICINE

## 2024-08-06 PROCEDURE — 82962 GLUCOSE BLOOD TEST: CPT | Performed by: INTERNAL MEDICINE

## 2024-08-06 PROCEDURE — 3077F SYST BP >= 140 MM HG: CPT | Performed by: INTERNAL MEDICINE

## 2024-08-06 PROCEDURE — 81002 URINALYSIS NONAUTO W/O SCOPE: CPT | Performed by: INTERNAL MEDICINE

## 2024-08-06 PROCEDURE — 3080F DIAST BP >= 90 MM HG: CPT | Performed by: INTERNAL MEDICINE

## 2024-08-06 RX ORDER — ATORVASTATIN CALCIUM 80 MG/1
80 TABLET, FILM COATED ORAL DAILY
Qty: 90 TABLET | Refills: 3 | Status: SHIPPED | OUTPATIENT
Start: 2024-08-06 | End: 2025-08-06

## 2024-08-06 RX ORDER — ASPIRIN 81 MG/1
81 TABLET ORAL DAILY
Qty: 90 TABLET | Refills: 3 | Status: SHIPPED | OUTPATIENT
Start: 2024-08-06 | End: 2025-08-06

## 2024-08-06 ASSESSMENT — PAIN SCALES - GENERAL: PAINLEVEL: 0-NO PAIN

## 2024-08-06 NOTE — PROGRESS NOTES
"Subjective   Judah Hunter is a 64 y.o. male who presents for incontinent bowel and bladder. .  Patient presents for follow-up of diabetes.  He complains of his legs feeling tired with activity.  They become stiff and achy after walking.  He had vascular studies done a few months back that showed moderate arterial disease bilaterally.  He was started on aspirin and a statin, but has not been taking these medications.  No back pain, numbness, tingling, saddle anesthesia.  He complains of frequent urination.  She admits that he has only been taking his glyburide 2 pills once a day rather than twice a day.  Blood glucose in the 200s in the office today.  Urine with glycosuria.        Objective     BP (!) 140/94 (BP Location: Right arm, Patient Position: Sitting, BP Cuff Size: Adult)   Pulse 93   Resp 18   Ht 1.702 m (5' 7\")   Wt 74.4 kg (164 lb)   SpO2 95%   BMI 25.69 kg/m²      Physical Exam  Constitutional:       Appearance: Normal appearance.   HENT:      Head: Normocephalic and atraumatic.      Nose: Nose normal.      Mouth/Throat:      Mouth: Mucous membranes are moist.      Pharynx: Oropharynx is clear.   Eyes:      Extraocular Movements: Extraocular movements intact.      Pupils: Pupils are equal, round, and reactive to light.   Cardiovascular:      Rate and Rhythm: Normal rate and regular rhythm.   Pulmonary:      Effort: No respiratory distress.      Breath sounds: Normal breath sounds. No wheezing, rhonchi or rales.   Abdominal:      General: Bowel sounds are normal. There is no distension.      Palpations: Abdomen is soft.      Tenderness: There is no abdominal tenderness. There is no guarding.   Musculoskeletal:      Right lower leg: No edema.      Left lower leg: No edema.   Skin:     General: Skin is warm and dry.   Neurological:      Mental Status: He is alert and oriented to person, place, and time. Mental status is at baseline.   Psychiatric:         Mood and Affect: Mood normal.         " Behavior: Behavior normal.         Assessment/Plan   Problem List Items Addressed This Visit       Mixed hyperlipidemia    Relevant Medications    atorvastatin (Lipitor) 80 mg tablet    Diabetes mellitus due to underlying condition with hyperglycemia, without long-term current use of insulin (Multi)     Patient encouraged to take his medications as prescribed  Repeat hemoglobin A1c at next visit         Relevant Orders    POCT Fingerstick Glucose manually resulted (Completed)    Tobacco abuse     Counseled on smoking cessation         Relevant Medications    aspirin 81 mg EC tablet    Nicotine dependence, cigarettes, with other nicotine-induced disorders    Relevant Medications    aspirin 81 mg EC tablet    Schizoaffective disorder, bipolar type (Multi)     Medications per psych         PAD (peripheral artery disease) (CMS-HCC)     Arterial study showed moderate bilateral PAD  Continue aspirin and statin          Other Visit Diagnoses       Encounter for annual wellness visit (AWV) in Medicare patient    -  Primary    Relevant Orders    CBC    Comprehensive Metabolic Panel    Hemoglobin A1C    Prostate Specific Antigen    Lipid Panel    Thyroid Stimulating Hormone    Vitamin D 25-Hydroxy,Total (for eval of Vitamin D levels)    Abnormal finding of blood chemistry, unspecified        Relevant Orders    CBC    Vitamin D deficiency, unspecified        Relevant Orders    Vitamin D 25-Hydroxy,Total (for eval of Vitamin D levels)    Secondary hypertension        Relevant Medications    aspirin 81 mg EC tablet    Urinary incontinence, unspecified type        Relevant Orders    POCT UA (nonautomated) manually resulted (Completed)          Resume aspirin and statin  Patient counseled on medication compliance  Patient counseled on smoking cessation, he is not ready to quit  Return in November for physical       Osmar Reed DO

## 2024-09-25 ENCOUNTER — HOSPITAL ENCOUNTER (OUTPATIENT)
Dept: VASCULAR MEDICINE | Facility: CLINIC | Age: 64
Discharge: HOME | End: 2024-09-25
Payer: MEDICARE

## 2024-09-25 DIAGNOSIS — I73.9 CLAUDICATION OF BOTH LOWER EXTREMITIES (CMS-HCC): ICD-10-CM

## 2024-09-25 PROCEDURE — 93924 LWR XTR VASC STDY BILAT: CPT

## 2024-09-25 PROCEDURE — 93924 LWR XTR VASC STDY BILAT: CPT | Performed by: INTERNAL MEDICINE

## 2024-10-02 ENCOUNTER — APPOINTMENT (OUTPATIENT)
Dept: VASCULAR SURGERY | Facility: CLINIC | Age: 64
End: 2024-10-02
Payer: MEDICARE

## 2024-10-04 ENCOUNTER — APPOINTMENT (OUTPATIENT)
Dept: VASCULAR SURGERY | Facility: CLINIC | Age: 64
End: 2024-10-04
Payer: MEDICARE

## 2024-11-13 ENCOUNTER — LAB (OUTPATIENT)
Dept: LAB | Facility: LAB | Age: 64
End: 2024-11-13
Payer: MEDICARE

## 2024-11-13 DIAGNOSIS — E08.65 DIABETES MELLITUS DUE TO UNDERLYING CONDITION WITH HYPERGLYCEMIA, WITHOUT LONG-TERM CURRENT USE OF INSULIN: ICD-10-CM

## 2024-11-13 DIAGNOSIS — E55.9 VITAMIN D DEFICIENCY, UNSPECIFIED: ICD-10-CM

## 2024-11-13 DIAGNOSIS — R79.9 ABNORMAL FINDING OF BLOOD CHEMISTRY, UNSPECIFIED: ICD-10-CM

## 2024-11-13 DIAGNOSIS — Z00.00 ENCOUNTER FOR ANNUAL WELLNESS VISIT (AWV) IN MEDICARE PATIENT: ICD-10-CM

## 2024-11-13 LAB
25(OH)D3 SERPL-MCNC: 21 NG/ML (ref 30–100)
ALBUMIN SERPL BCP-MCNC: 4.3 G/DL (ref 3.4–5)
ALP SERPL-CCNC: 94 U/L (ref 33–136)
ALT SERPL W P-5'-P-CCNC: 24 U/L (ref 10–52)
ANION GAP SERPL CALC-SCNC: 11 MMOL/L (ref 10–20)
AST SERPL W P-5'-P-CCNC: 16 U/L (ref 9–39)
BILIRUB SERPL-MCNC: 0.8 MG/DL (ref 0–1.2)
BUN SERPL-MCNC: 20 MG/DL (ref 6–23)
CALCIUM SERPL-MCNC: 9.9 MG/DL (ref 8.6–10.3)
CHLORIDE SERPL-SCNC: 103 MMOL/L (ref 98–107)
CHOLEST SERPL-MCNC: 169 MG/DL (ref 0–199)
CHOLESTEROL/HDL RATIO: 4.7
CO2 SERPL-SCNC: 30 MMOL/L (ref 21–32)
CREAT SERPL-MCNC: 1.17 MG/DL (ref 0.5–1.3)
EGFRCR SERPLBLD CKD-EPI 2021: 70 ML/MIN/1.73M*2
ERYTHROCYTE [DISTWIDTH] IN BLOOD BY AUTOMATED COUNT: 12.2 % (ref 11.5–14.5)
GLUCOSE SERPL-MCNC: 251 MG/DL (ref 74–99)
HCT VFR BLD AUTO: 55.9 % (ref 41–52)
HDLC SERPL-MCNC: 35.6 MG/DL
HGB BLD-MCNC: 18.2 G/DL (ref 13.5–17.5)
LDLC SERPL CALC-MCNC: 103 MG/DL
MCH RBC QN AUTO: 30.5 PG (ref 26–34)
MCHC RBC AUTO-ENTMCNC: 32.6 G/DL (ref 32–36)
MCV RBC AUTO: 94 FL (ref 80–100)
NON HDL CHOLESTEROL: 133 MG/DL (ref 0–149)
NRBC BLD-RTO: 0 /100 WBCS (ref 0–0)
PLATELET # BLD AUTO: 276 X10*3/UL (ref 150–450)
POTASSIUM SERPL-SCNC: 4.9 MMOL/L (ref 3.5–5.3)
PROT SERPL-MCNC: 7.2 G/DL (ref 6.4–8.2)
PSA SERPL-MCNC: 0.36 NG/ML
RBC # BLD AUTO: 5.97 X10*6/UL (ref 4.5–5.9)
SODIUM SERPL-SCNC: 139 MMOL/L (ref 136–145)
TRIGL SERPL-MCNC: 154 MG/DL (ref 0–149)
TSH SERPL-ACNC: 3.24 MIU/L (ref 0.44–3.98)
VLDL: 31 MG/DL (ref 0–40)
WBC # BLD AUTO: 8.3 X10*3/UL (ref 4.4–11.3)

## 2024-11-13 PROCEDURE — 84153 ASSAY OF PSA TOTAL: CPT

## 2024-11-13 PROCEDURE — 85027 COMPLETE CBC AUTOMATED: CPT

## 2024-11-13 PROCEDURE — 36415 COLL VENOUS BLD VENIPUNCTURE: CPT

## 2024-11-13 PROCEDURE — 80061 LIPID PANEL: CPT

## 2024-11-13 PROCEDURE — 80053 COMPREHEN METABOLIC PANEL: CPT

## 2024-11-13 PROCEDURE — 83036 HEMOGLOBIN GLYCOSYLATED A1C: CPT

## 2024-11-13 PROCEDURE — 84443 ASSAY THYROID STIM HORMONE: CPT

## 2024-11-13 PROCEDURE — 82306 VITAMIN D 25 HYDROXY: CPT

## 2024-11-14 LAB
EST. AVERAGE GLUCOSE BLD GHB EST-MCNC: 298 MG/DL
HBA1C MFR BLD: 12 %

## 2024-11-19 ENCOUNTER — APPOINTMENT (OUTPATIENT)
Dept: PRIMARY CARE | Facility: CLINIC | Age: 64
End: 2024-11-19
Payer: MEDICARE

## 2024-11-19 VITALS
SYSTOLIC BLOOD PRESSURE: 144 MMHG | RESPIRATION RATE: 14 BRPM | OXYGEN SATURATION: 96 % | WEIGHT: 163 LBS | BODY MASS INDEX: 25.53 KG/M2 | HEART RATE: 64 BPM | DIASTOLIC BLOOD PRESSURE: 68 MMHG

## 2024-11-19 DIAGNOSIS — K21.9 GASTROESOPHAGEAL REFLUX DISEASE, UNSPECIFIED WHETHER ESOPHAGITIS PRESENT: ICD-10-CM

## 2024-11-19 DIAGNOSIS — Z00.00 ANNUAL PHYSICAL EXAM: ICD-10-CM

## 2024-11-19 DIAGNOSIS — E08.65 DIABETES MELLITUS DUE TO UNDERLYING CONDITION WITH HYPERGLYCEMIA, WITHOUT LONG-TERM CURRENT USE OF INSULIN: Primary | ICD-10-CM

## 2024-11-19 DIAGNOSIS — F20.9 SCHIZOPHRENIA, UNSPECIFIED TYPE: ICD-10-CM

## 2024-11-19 DIAGNOSIS — E78.2 MIXED HYPERLIPIDEMIA: ICD-10-CM

## 2024-11-19 DIAGNOSIS — Z23 NEEDS FLU SHOT: ICD-10-CM

## 2024-11-19 DIAGNOSIS — E55.9 VITAMIN D DEFICIENCY: ICD-10-CM

## 2024-11-19 DIAGNOSIS — E11.65 TYPE 2 DIABETES MELLITUS WITH HYPERGLYCEMIA, WITHOUT LONG-TERM CURRENT USE OF INSULIN: ICD-10-CM

## 2024-11-19 DIAGNOSIS — F41.9 ANXIETY: ICD-10-CM

## 2024-11-19 RX ORDER — VIT C/E/ZN/COPPR/LUTEIN/ZEAXAN 250MG-90MG
25 CAPSULE ORAL DAILY
Qty: 90 CAPSULE | Refills: 3 | Status: SHIPPED | OUTPATIENT
Start: 2024-11-19 | End: 2025-11-19

## 2024-11-19 RX ORDER — METFORMIN HYDROCHLORIDE 500 MG/1
500 TABLET ORAL
Qty: 180 TABLET | Refills: 3 | Status: SHIPPED | OUTPATIENT
Start: 2024-11-19 | End: 2025-11-14

## 2024-11-19 ASSESSMENT — ENCOUNTER SYMPTOMS
OCCASIONAL FEELINGS OF UNSTEADINESS: 0
DEPRESSION: 0
LOSS OF SENSATION IN FEET: 0

## 2024-11-19 ASSESSMENT — ANXIETY QUESTIONNAIRES
IF YOU CHECKED OFF ANY PROBLEMS ON THIS QUESTIONNAIRE, HOW DIFFICULT HAVE THESE PROBLEMS MADE IT FOR YOU TO DO YOUR WORK, TAKE CARE OF THINGS AT HOME, OR GET ALONG WITH OTHER PEOPLE: NOT DIFFICULT AT ALL
3. WORRYING TOO MUCH ABOUT DIFFERENT THINGS: NOT AT ALL
6. BECOMING EASILY ANNOYED OR IRRITABLE: NOT AT ALL
GAD7 TOTAL SCORE: 0
5. BEING SO RESTLESS THAT IT IS HARD TO SIT STILL: NOT AT ALL
2. NOT BEING ABLE TO STOP OR CONTROL WORRYING: NOT AT ALL
4. TROUBLE RELAXING: NOT AT ALL
1. FEELING NERVOUS, ANXIOUS, OR ON EDGE: NOT AT ALL
7. FEELING AFRAID AS IF SOMETHING AWFUL MIGHT HAPPEN: NOT AT ALL

## 2024-11-19 ASSESSMENT — PATIENT HEALTH QUESTIONNAIRE - PHQ9
1. LITTLE INTEREST OR PLEASURE IN DOING THINGS: NOT AT ALL
SUM OF ALL RESPONSES TO PHQ9 QUESTIONS 1 AND 2: 0
2. FEELING DOWN, DEPRESSED OR HOPELESS: NOT AT ALL

## 2024-11-19 ASSESSMENT — COLUMBIA-SUICIDE SEVERITY RATING SCALE - C-SSRS
6. HAVE YOU EVER DONE ANYTHING, STARTED TO DO ANYTHING, OR PREPARED TO DO ANYTHING TO END YOUR LIFE?: NO
1. IN THE PAST MONTH, HAVE YOU WISHED YOU WERE DEAD OR WISHED YOU COULD GO TO SLEEP AND NOT WAKE UP?: NO
2. HAVE YOU ACTUALLY HAD ANY THOUGHTS OF KILLING YOURSELF?: NO

## 2024-11-19 ASSESSMENT — PAIN SCALES - GENERAL: PAINLEVEL_OUTOF10: 0-NO PAIN

## 2024-11-27 ENCOUNTER — HOSPITAL ENCOUNTER (OUTPATIENT)
Dept: RADIOLOGY | Facility: CLINIC | Age: 64
Discharge: HOME | End: 2024-11-27
Payer: MEDICARE

## 2024-11-27 DIAGNOSIS — Z87.891 PERSONAL HISTORY OF NICOTINE DEPENDENCE: ICD-10-CM

## 2024-11-27 PROCEDURE — 71271 CT THORAX LUNG CANCER SCR C-: CPT

## 2024-12-08 NOTE — ASSESSMENT & PLAN NOTE
Orders:    metFORMIN (Glucophage) 500 mg tablet; Take 1 tablet (500 mg) by mouth 2 times daily (morning and late afternoon).

## 2024-12-08 NOTE — PROGRESS NOTES
Subjective   Reason for Visit: Judah Hunter is an 64 y.o. male here for a Medicare Wellness visit.     Past Medical, Surgical, and Family History reviewed and updated in chart.         Patient presents for his annual physical.  Accompanied by wife.  Patient has lost weight unintentionally.  His weight went from 177 pounds down to 263 pounds.  Discussed the workup of unintentional weight loss.  Patient deferring endoscopies.  Labs reviewed with patient.  His hemoglobin A1c is notable at 12.0%.  Discussed the importance of glucose control at length.  Discussed that this may be the reason for his weight loss.  Vitamin D also low.  He denies depression.  No recent falls.  He feels safe at home.        Patient Care Team:  Osmar Reed DO as PCP - General (Internal Medicine)     Review of Systems   All other systems reviewed and are negative.      Objective   Vitals:  /68   Pulse 64   Resp 14   Wt 73.9 kg (163 lb)   SpO2 96%   BMI 25.53 kg/m²       Physical Exam  Constitutional:       Appearance: Normal appearance.   HENT:      Head: Normocephalic and atraumatic.      Nose: Nose normal.      Mouth/Throat:      Mouth: Mucous membranes are moist.      Pharynx: Oropharynx is clear.   Eyes:      Extraocular Movements: Extraocular movements intact.      Pupils: Pupils are equal, round, and reactive to light.   Cardiovascular:      Rate and Rhythm: Normal rate and regular rhythm.   Pulmonary:      Effort: No respiratory distress.      Breath sounds: Normal breath sounds. No wheezing, rhonchi or rales.   Abdominal:      General: Bowel sounds are normal. There is no distension.      Palpations: Abdomen is soft.      Tenderness: There is no abdominal tenderness. There is no guarding.   Musculoskeletal:      Right lower leg: No edema.      Left lower leg: No edema.   Skin:     General: Skin is warm and dry.   Neurological:      Mental Status: He is alert and oriented to person, place, and time. Mental  status is at baseline.   Psychiatric:      Comments: Pressured speech, tangential         Assessment & Plan  Annual physical exam    Orders:    ECG 12 lead (Clinic Performed)    Needs flu shot    Orders:    Flu vaccine, trivalent, preservative free, age 6 months and greater (Fluraix/Fluzone/Flulaval)    Diabetes mellitus due to underlying condition with hyperglycemia, without long-term current use of insulin    Orders:    metFORMIN (Glucophage) 500 mg tablet; Take 1 tablet (500 mg) by mouth 2 times daily (morning and late afternoon).    Vitamin D deficiency    Orders:    cholecalciferol (Vitamin D-3) 25 MCG (1000 UT) capsule; Take 1 capsule (25 mcg) by mouth once daily.    Type 2 diabetes mellitus with hyperglycemia, without long-term current use of insulin    Orders:    Hemoglobin A1C; Future    Anxiety  Continue current medications  Following with psych         Gastroesophageal reflux disease, unspecified whether esophagitis present  Continue current medications         Mixed hyperlipidemia  Continue current medications         Schizophrenia, unspecified type  Continue current medications per psych              Adjust diabetes regimen  Follow-up in 3 months for repeat hemoglobin A1c

## 2025-01-02 ENCOUNTER — APPOINTMENT (OUTPATIENT)
Dept: VASCULAR SURGERY | Facility: CLINIC | Age: 65
End: 2025-01-02
Payer: MEDICARE

## 2025-02-05 ENCOUNTER — APPOINTMENT (OUTPATIENT)
Dept: VASCULAR SURGERY | Facility: CLINIC | Age: 65
End: 2025-02-05
Payer: MEDICARE

## 2025-02-05 VITALS
SYSTOLIC BLOOD PRESSURE: 126 MMHG | BODY MASS INDEX: 26.37 KG/M2 | HEIGHT: 67 IN | HEART RATE: 67 BPM | OXYGEN SATURATION: 98 % | WEIGHT: 168 LBS | DIASTOLIC BLOOD PRESSURE: 72 MMHG

## 2025-02-05 DIAGNOSIS — I73.9 PAD (PERIPHERAL ARTERY DISEASE) (CMS-HCC): ICD-10-CM

## 2025-02-05 PROCEDURE — 99214 OFFICE O/P EST MOD 30 MIN: CPT | Performed by: SURGERY

## 2025-02-05 PROCEDURE — 3074F SYST BP LT 130 MM HG: CPT | Performed by: SURGERY

## 2025-02-05 PROCEDURE — 3078F DIAST BP <80 MM HG: CPT | Performed by: SURGERY

## 2025-02-05 PROCEDURE — 3008F BODY MASS INDEX DOCD: CPT | Performed by: SURGERY

## 2025-02-09 NOTE — PROGRESS NOTES
"Judah Hunter is a 64 y.o. male     Subjective   This patient presents today for follow-up of his peripheral arterial disease and claudication.  He still gets some claudication symptoms left greater than right but is able to do his everyday living activities.  He is currently 64 years old.  He continues to smoke.  He is also a diabetic.  He denies any fever chills nausea vomiting or headache.         Objective     Vitals:    02/05/25 1100   BP: 126/72   Pulse: 67   SpO2: 98%      Physical Exam  This patient is alert and oriented x 3.  He is in no acute distress.  Head is normocephalic.  Pupils are equal and reactive to light and accommodation.  Neck is soft and supple without palpable lymph nodes.  Heart is regular rate.  Lungs have some decreased breath sounds posteriorly.  Abdomen is soft with positive bowel sounds there is no pain on palpation.  Upper and lower extremities seem to have adequate range of motion.  He does have palpable brachial radial and femoral pulses.  His popliteal pulses are palpable but very weak on palpation.  Skin turgor is adequate.  Psychologically seems to be acting appropriately.  Blood pressure 126/72, pulse 67, height 1.702 m (5' 7\"), weight 76.2 kg (168 lb), SpO2 98%.            Patient Active Problem List    Diagnosis Date Noted    PAD (peripheral artery disease) (CMS-[facility]) 06/06/2024    Anosmia 11/30/2023    Conductive hearing loss in left ear 11/30/2023    Constipation 11/30/2023    Epidural hematoma (Multi) 11/30/2023    SDH (subdural hematoma) (Multi) 11/30/2023    Impacted cerumen of both ears 11/30/2023    Weight loss 11/30/2023    Mixed hyperlipidemia 08/29/2023    Diabetes mellitus due to underlying condition with hyperglycemia, without long-term current use of insulin 08/29/2023    Tobacco abuse 08/29/2023    Nicotine dependence, cigarettes, with other nicotine-induced disorders 08/29/2023    Anxiety 08/29/2023    Gastroesophageal reflux disease 08/29/2023    Sleep " apnea 08/29/2023    Schizophrenia 04/03/2022    Generalized anxiety disorder 01/11/2021    Schizoaffective disorder, bipolar type (Multi) 02/22/2019    OCD (obsessive compulsive disorder) 02/22/2019    Irritation of penis 09/23/2010    Dysthymic disorder 07/02/2010    Motor tic disorder 07/02/2010          Current Outpatient Medications:     aspirin 81 mg EC tablet, Take 1 tablet (81 mg) by mouth once daily., Disp: 90 tablet, Rfl: 3    atorvastatin (Lipitor) 80 mg tablet, Take 1 tablet (80 mg) by mouth once daily., Disp: 90 tablet, Rfl: 3    benztropine (Cogentin) 0.5 mg tablet, , Disp: , Rfl:     cholecalciferol (Vitamin D-3) 25 MCG (1000 UT) capsule, Take 1 capsule (25 mcg) by mouth once daily., Disp: 90 capsule, Rfl: 3    clonazePAM (KlonoPIN) 0.5 mg tablet, Take 1 tablet (0.5 mg) by mouth once daily at bedtime., Disp: , Rfl:     glyBURIDE (Diabeta) 5 mg tablet, Take 2 tablets (10 mg) by mouth 2 times a day with meals., Disp: 360 tablet, Rfl: 3    metFORMIN (Glucophage) 500 mg tablet, Take 1 tablet (500 mg) by mouth 2 times daily (morning and late afternoon)., Disp: 180 tablet, Rfl: 3    OLANZapine (ZyPREXA) 20 mg tablet, , Disp: , Rfl:     sertraline (Zoloft) 50 mg tablet, , Disp: , Rfl:      Lab Results   Component Value Date    WBC 8.3 11/13/2024    HGB 18.2 (H) 11/13/2024    HCT 55.9 (H) 11/13/2024     11/13/2024    CHOL 169 11/13/2024    TRIG 154 (H) 11/13/2024    HDL 35.6 11/13/2024    ALT 24 11/13/2024    AST 16 11/13/2024     11/13/2024    K 4.9 11/13/2024     11/13/2024    CREATININE 1.17 11/13/2024    BUN 20 11/13/2024    CO2 30 11/13/2024    TSH 3.24 11/13/2024    PSA 0.36 11/13/2024    HGBA1C 12.0 (H) 11/13/2024       CT lung screening low dose    Result Date: 11/29/2024  Interpreted By:  Colby Acosta, STUDY: CT LUNG SCREENING LOW DOSE; 11/27/2024 2:52 pm   INDICATION: Signs/Symptoms:lung screening.   COMPARISON: 10/10/2023   ACCESSION NUMBER(S): [id number]   ORDERING  CLINICIAN: PEDRO SHEFFIELD   TECHNIQUE: Helical data acquisition of the chest was obtained without IV contrast material.  Images were reformatted in axial, coronal, and sagittal planes.   FINDINGS: LUNGS AND AIRWAYS: The trachea and central airways are patent. No endobronchial lesion is seen.   There is mild subpleural reticulation with cystic change consistent with smoking-related respiratory bronchiolitis.There is no focal consolidation, pleural effusion, or pneumothorax.   There are 8 mm left major fissural lung nodule.   MEDIASTINUM AND KEERTHI, LOWER NECK AND AXILLA: The visualized thyroid gland is within normal limits. Scattered mediastinal lymph nodes are felt to be reactive/inflammatory. Slight interval decrease in mildly prominent left axillary lymph node most likely inflammatory. Esophagus appears within normal limits as seen.   HEART AND VESSELS: The thoracic aorta normal in course and caliber. Main pulmonary artery and its branches are normal in caliber. Estimated coronary artery calcium score is 99. The cardiac chambers are not enlarged. There is no pericardial effusion seen.   UPPER ABDOMEN: The visualized subdiaphragmatic structures demonstrate no remarkable findings.       CHEST WALL AND OSSEOUS STRUCTURES: Chest wall is within normal limits. No acute osseous pathology.There are no suspicious osseous lesions.       1.  Few small bilateral noncalcified pulmonary nodules measuring up to 5 mm, likely benign. Continued screening with low-dose noncontrast chest CT in 12 months (from current date) is recommended. 2. Estimated coronary artery calcium score is 99* which correlates with at least 60th percentile rank as compared to matched RAPHAEL-study subjects([email or web address]).   LUNG RADS CATEGORY: Lung Rad: Lung-RADS 2 (Benign Appearance or Indolent Behavior)   Recommendation: Continue annual screening with Low Dose Chest CT in 12 months, recommended as per American College of  Radiology Guidelines Lung-RADS Version 2022.       *The patient's CAC score was measured with an FDA-cleared AI tool that correlates well with traditional methods. However, due to the non-gated CT scan and new algorithm, AI-powered scores should not replace traditional cardiovascular risk assessment. For further assistance, refer to the [facility] Cardiovascular Prevention Program via an [facility] referral to 'Cardiology Prevention Program.'   MACRO: None   Signed by: Colby Acosta 11/29/2024 6:39 AM Dictation workstation:   [id number]                Assessment/Plan   Assessment & Plan  PAD (peripheral artery disease) (CMS-[facility])      This patient presents today for follow-up of his peripheral arterial disease and claudication bilateral lower extremities.  His left leg is a little bit worse than his right.  He is able to do his everyday living activities.  His last noninvasive arterial study was done in September of last year.  This showed mild to slightly moderate disease on the right and moderate disease on the left.  After 1 minute of exercise his TAMRA dropped to 0.48 on the right and 0.59 on the left.  At this time, I am encouraging him to absolutely quit smoking.  His weight is good.  I am also encouraging him to exercise on a daily basis with his lower extremities.  He will follow-up in 6 months.  At that time, I would like to get ABIs with exercise      Hua Adams, DO

## 2025-04-22 DIAGNOSIS — E08.65 DIABETES MELLITUS DUE TO UNDERLYING CONDITION WITH HYPERGLYCEMIA, WITHOUT LONG-TERM CURRENT USE OF INSULIN: ICD-10-CM

## 2025-04-22 RX ORDER — GLYBURIDE 5 MG/1
10 TABLET ORAL
Qty: 360 TABLET | Refills: 3 | OUTPATIENT
Start: 2025-04-22

## 2025-07-16 ENCOUNTER — APPOINTMENT (OUTPATIENT)
Dept: VASCULAR MEDICINE | Facility: CLINIC | Age: 65
End: 2025-07-16
Payer: MEDICARE

## 2025-07-30 ENCOUNTER — APPOINTMENT (OUTPATIENT)
Dept: VASCULAR SURGERY | Facility: CLINIC | Age: 65
End: 2025-07-30
Payer: MEDICARE

## 2025-10-01 ENCOUNTER — APPOINTMENT (OUTPATIENT)
Dept: VASCULAR SURGERY | Facility: CLINIC | Age: 65
End: 2025-10-01
Payer: MEDICARE

## 2025-10-29 ENCOUNTER — APPOINTMENT (OUTPATIENT)
Dept: VASCULAR SURGERY | Facility: CLINIC | Age: 65
End: 2025-10-29
Payer: MEDICARE